# Patient Record
Sex: MALE | Race: WHITE | URBAN - METROPOLITAN AREA
[De-identification: names, ages, dates, MRNs, and addresses within clinical notes are randomized per-mention and may not be internally consistent; named-entity substitution may affect disease eponyms.]

---

## 2017-04-20 ENCOUNTER — HOSPITAL ENCOUNTER (OUTPATIENT)
Dept: SURGERY | Age: 62
Discharge: HOME | End: 2017-04-20
Attending: INTERNAL MEDICINE | Admitting: INTERNAL MEDICINE

## 2017-04-20 VITALS
OXYGEN SATURATION: 98 % | HEART RATE: 83 BPM | SYSTOLIC BLOOD PRESSURE: 111 MMHG | WEIGHT: 184 LBS | HEIGHT: 66 IN | TEMPERATURE: 97.6 F | BODY MASS INDEX: 29.57 KG/M2 | DIASTOLIC BLOOD PRESSURE: 72 MMHG | RESPIRATION RATE: 17 BRPM

## 2017-04-20 LAB
GLUCOSE BLD-MCNC: 95 MG/DL (ref 70–99)
PERFORMED ON: NORMAL

## 2017-04-20 RX ORDER — GLUCOSAMINE HCL 500 MG
TABLET ORAL
COMMUNITY

## 2017-04-20 RX ORDER — SODIUM CHLORIDE, SODIUM LACTATE, POTASSIUM CHLORIDE, CALCIUM CHLORIDE 600; 310; 30; 20 MG/100ML; MG/100ML; MG/100ML; MG/100ML
INJECTION, SOLUTION INTRAVENOUS CONTINUOUS
Status: DISCONTINUED | OUTPATIENT
Start: 2017-04-20 | End: 2017-04-21 | Stop reason: HOSPADM

## 2017-04-20 RX ORDER — EZETIMIBE 10 MG/1
10 TABLET ORAL DAILY
COMMUNITY

## 2017-04-20 RX ORDER — LYSINE HCL 500 MG
TABLET ORAL
COMMUNITY

## 2017-04-20 RX ORDER — LIDOCAINE HYDROCHLORIDE 10 MG/ML
0.1 INJECTION, SOLUTION INFILTRATION; PERINEURAL ONCE
Status: DISCONTINUED | OUTPATIENT
Start: 2017-04-20 | End: 2017-04-21 | Stop reason: HOSPADM

## 2017-04-20 RX ORDER — ASPIRIN 81 MG/1
81 TABLET, CHEWABLE ORAL DAILY
COMMUNITY

## 2017-04-20 RX ORDER — UBIDECARENONE 75 MG
50 CAPSULE ORAL DAILY
COMMUNITY

## 2017-04-20 RX ADMIN — SODIUM CHLORIDE, SODIUM LACTATE, POTASSIUM CHLORIDE, CALCIUM CHLORIDE: 600; 310; 30; 20 INJECTION, SOLUTION INTRAVENOUS at 08:58

## 2017-04-20 ASSESSMENT — PAIN - FUNCTIONAL ASSESSMENT: PAIN_FUNCTIONAL_ASSESSMENT: 0-10

## 2017-04-20 ASSESSMENT — PAIN SCALES - GENERAL: PAINLEVEL_OUTOF10: 0

## 2024-04-03 ENCOUNTER — HOSPITAL ENCOUNTER (OUTPATIENT)
Dept: CARDIAC REHAB | Age: 69
Setting detail: THERAPIES SERIES
Discharge: HOME | End: 2024-04-03
Payer: COMMERCIAL

## 2024-04-03 PROCEDURE — 93798 PHYS/QHP OP CAR RHAB W/ECG: CPT

## 2024-04-05 ENCOUNTER — HOSPITAL ENCOUNTER (OUTPATIENT)
Dept: CARDIAC REHAB | Age: 69
Setting detail: THERAPIES SERIES
Discharge: HOME | End: 2024-04-05
Payer: COMMERCIAL

## 2024-04-05 LAB
GLUCOSE BLD-MCNC: 126 MG/DL (ref 70–99)
PERFORMED ON: ABNORMAL

## 2024-04-05 PROCEDURE — 93798 PHYS/QHP OP CAR RHAB W/ECG: CPT

## 2024-04-08 ENCOUNTER — HOSPITAL ENCOUNTER (OUTPATIENT)
Dept: CARDIAC REHAB | Age: 69
Setting detail: THERAPIES SERIES
Discharge: HOME | End: 2024-04-08
Payer: MEDICARE

## 2024-04-08 PROCEDURE — 93798 PHYS/QHP OP CAR RHAB W/ECG: CPT

## 2024-04-10 ENCOUNTER — HOSPITAL ENCOUNTER (OUTPATIENT)
Dept: CARDIAC REHAB | Age: 69
Setting detail: THERAPIES SERIES
Discharge: HOME | End: 2024-04-10
Payer: MEDICARE

## 2024-04-10 PROCEDURE — 93798 PHYS/QHP OP CAR RHAB W/ECG: CPT

## 2024-04-12 ENCOUNTER — HOSPITAL ENCOUNTER (OUTPATIENT)
Dept: CARDIAC REHAB | Age: 69
Setting detail: THERAPIES SERIES
Discharge: HOME | End: 2024-04-12
Payer: MEDICARE

## 2024-04-12 PROCEDURE — 93798 PHYS/QHP OP CAR RHAB W/ECG: CPT

## 2024-04-15 ENCOUNTER — HOSPITAL ENCOUNTER (OUTPATIENT)
Dept: CARDIAC REHAB | Age: 69
Setting detail: THERAPIES SERIES
Discharge: HOME | End: 2024-04-15
Payer: MEDICARE

## 2024-04-15 LAB
GLUCOSE BLD-MCNC: 145 MG/DL (ref 70–99)
PERFORMED ON: ABNORMAL

## 2024-04-15 PROCEDURE — 93798 PHYS/QHP OP CAR RHAB W/ECG: CPT

## 2024-04-17 ENCOUNTER — HOSPITAL ENCOUNTER (OUTPATIENT)
Dept: CARDIAC REHAB | Age: 69
Setting detail: THERAPIES SERIES
Discharge: HOME | End: 2024-04-17
Payer: MEDICARE

## 2024-04-17 PROCEDURE — 93798 PHYS/QHP OP CAR RHAB W/ECG: CPT

## 2024-04-19 ENCOUNTER — HOSPITAL ENCOUNTER (OUTPATIENT)
Dept: CARDIAC REHAB | Age: 69
Setting detail: THERAPIES SERIES
Discharge: HOME | End: 2024-04-19
Payer: MEDICARE

## 2024-04-19 PROCEDURE — 93798 PHYS/QHP OP CAR RHAB W/ECG: CPT

## 2024-04-22 ENCOUNTER — HOSPITAL ENCOUNTER (OUTPATIENT)
Dept: CARDIAC REHAB | Age: 69
Setting detail: THERAPIES SERIES
Discharge: HOME | End: 2024-04-22
Payer: MEDICARE

## 2024-04-22 PROCEDURE — 93798 PHYS/QHP OP CAR RHAB W/ECG: CPT

## 2024-04-24 ENCOUNTER — HOSPITAL ENCOUNTER (OUTPATIENT)
Dept: CARDIAC REHAB | Age: 69
Setting detail: THERAPIES SERIES
Discharge: HOME | End: 2024-04-24
Payer: MEDICARE

## 2024-04-24 PROCEDURE — 93798 PHYS/QHP OP CAR RHAB W/ECG: CPT

## 2024-04-26 ENCOUNTER — HOSPITAL ENCOUNTER (OUTPATIENT)
Dept: CARDIAC REHAB | Age: 69
Setting detail: THERAPIES SERIES
Discharge: HOME | End: 2024-04-26
Payer: MEDICARE

## 2024-04-26 PROCEDURE — 93798 PHYS/QHP OP CAR RHAB W/ECG: CPT

## 2024-04-29 ENCOUNTER — HOSPITAL ENCOUNTER (OUTPATIENT)
Dept: CARDIAC REHAB | Age: 69
Setting detail: THERAPIES SERIES
Discharge: HOME | End: 2024-04-29
Payer: MEDICARE

## 2024-04-29 PROCEDURE — 93798 PHYS/QHP OP CAR RHAB W/ECG: CPT

## 2024-05-01 ENCOUNTER — HOSPITAL ENCOUNTER (OUTPATIENT)
Dept: CARDIAC REHAB | Age: 69
Setting detail: THERAPIES SERIES
Discharge: HOME | End: 2024-05-01
Payer: MEDICARE

## 2024-05-01 PROCEDURE — 93798 PHYS/QHP OP CAR RHAB W/ECG: CPT

## 2024-05-03 ENCOUNTER — HOSPITAL ENCOUNTER (OUTPATIENT)
Dept: CARDIAC REHAB | Age: 69
Setting detail: THERAPIES SERIES
Discharge: HOME | End: 2024-05-03
Payer: MEDICARE

## 2024-05-03 PROCEDURE — 93798 PHYS/QHP OP CAR RHAB W/ECG: CPT

## 2024-05-06 ENCOUNTER — HOSPITAL ENCOUNTER (OUTPATIENT)
Dept: CARDIAC REHAB | Age: 69
Setting detail: THERAPIES SERIES
Discharge: HOME | End: 2024-05-06
Payer: MEDICARE

## 2024-05-06 PROCEDURE — 93798 PHYS/QHP OP CAR RHAB W/ECG: CPT

## 2024-05-08 ENCOUNTER — HOSPITAL ENCOUNTER (OUTPATIENT)
Dept: CARDIAC REHAB | Age: 69
Setting detail: THERAPIES SERIES
Discharge: HOME | End: 2024-05-08
Payer: MEDICARE

## 2024-05-08 PROCEDURE — 93798 PHYS/QHP OP CAR RHAB W/ECG: CPT

## 2024-05-10 ENCOUNTER — APPOINTMENT (OUTPATIENT)
Dept: CARDIAC REHAB | Age: 69
End: 2024-05-10
Payer: MEDICARE

## 2024-05-12 ENCOUNTER — APPOINTMENT (OUTPATIENT)
Dept: GENERAL RADIOLOGY | Age: 69
End: 2024-05-12
Payer: MEDICARE

## 2024-05-12 ENCOUNTER — HOSPITAL ENCOUNTER (EMERGENCY)
Age: 69
Discharge: HOME | End: 2024-05-12
Payer: MEDICARE

## 2024-05-12 VITALS
TEMPERATURE: 98.5 F | SYSTOLIC BLOOD PRESSURE: 122 MMHG | HEART RATE: 65 BPM | DIASTOLIC BLOOD PRESSURE: 85 MMHG | OXYGEN SATURATION: 100 % | RESPIRATION RATE: 16 BRPM

## 2024-05-12 DIAGNOSIS — S69.92XA INJURY OF FINGER OF LEFT HAND, INITIAL ENCOUNTER: Primary | ICD-10-CM

## 2024-05-12 PROCEDURE — 99283 EMERGENCY DEPT VISIT LOW MDM: CPT

## 2024-05-12 PROCEDURE — 73130 X-RAY EXAM OF HAND: CPT

## 2024-05-12 ASSESSMENT — LIFESTYLE VARIABLES
HOW MANY STANDARD DRINKS CONTAINING ALCOHOL DO YOU HAVE ON A TYPICAL DAY: 1 OR 2
HOW OFTEN DO YOU HAVE A DRINK CONTAINING ALCOHOL: MONTHLY OR LESS

## 2024-05-13 ENCOUNTER — HOSPITAL ENCOUNTER (OUTPATIENT)
Dept: CARDIAC REHAB | Age: 69
Setting detail: THERAPIES SERIES
Discharge: HOME | End: 2024-05-13
Payer: MEDICARE

## 2024-05-13 PROCEDURE — 93798 PHYS/QHP OP CAR RHAB W/ECG: CPT

## 2024-05-14 NOTE — ED PROVIDER NOTES
finger, patient is able to flex and extend without evidence of tendon involvement.  No break in skin integrity.  Other extremities are atraumatic, no other digit trauma.    DIAGNOSTIC RESULTS   LABS:    Labs Reviewed - No data to display    When ordered only abnormal lab results are displayed. All other labs were within normal range or not returned as of this dictation.    EKG: When ordered, EKG's are interpreted by the Emergency Department Physician in the absence of a cardiologist.  Please see their note for interpretation of EKG.    RADIOLOGY:   Non-plain film images such as CT, Ultrasound and MRI are read by the radiologist. Plain radiographic images are visualized and preliminarily interpreted by the ED Provider with the below findings:        Interpretation per the Radiologist below, if available at the time of this note:    XR HAND LEFT (MIN 3 VIEWS)   Final Result   No fracture or dislocation of the left hand.           XR HAND LEFT (MIN 3 VIEWS)    Result Date: 5/12/2024  EXAMINATION: THREE XRAY VIEWS OF THE LEFT HAND 5/12/2024 12:57 pm COMPARISON: None. HISTORY: ORDERING SYSTEM PROVIDED HISTORY: Injury TECHNOLOGIST PROVIDED HISTORY: Reason for exam:->Injury Reason for Exam: middle finger smashed FINDINGS: The hand appears unremarkable. The metacarpals and the phalanges appear normal with no fractures. Joint spaces are well maintained. Soft tissues appear unremarkable.     No fracture or dislocation of the left hand.       No results found.    PROCEDURES   Unless otherwise noted below, none     Procedures    CRITICAL CARE TIME (.cctime)       PAST MEDICAL HISTORY      has a past medical history of Allergic rhinitis, Anemia, unspecified, Celiac disease, Celiac disease, Foot pain, left, NOLAN (obstructive sleep apnea), Other and unspecified hyperlipidemia, Routine general medical examination at a health care facility, and Unspecified essential hypertension.     EMERGENCY DEPARTMENT COURSE and DIFFERENTIAL

## 2024-05-15 ENCOUNTER — HOSPITAL ENCOUNTER (OUTPATIENT)
Dept: CARDIAC REHAB | Age: 69
Setting detail: THERAPIES SERIES
Discharge: HOME | End: 2024-05-15
Payer: MEDICARE

## 2024-05-15 PROCEDURE — 93798 PHYS/QHP OP CAR RHAB W/ECG: CPT

## 2024-05-17 ENCOUNTER — APPOINTMENT (OUTPATIENT)
Dept: CARDIAC REHAB | Age: 69
End: 2024-05-17
Payer: MEDICARE

## 2024-05-20 ENCOUNTER — APPOINTMENT (OUTPATIENT)
Dept: CARDIAC REHAB | Age: 69
End: 2024-05-20
Payer: MEDICARE

## 2024-05-22 ENCOUNTER — APPOINTMENT (OUTPATIENT)
Dept: CARDIAC REHAB | Age: 69
End: 2024-05-22
Payer: MEDICARE

## 2024-05-24 ENCOUNTER — APPOINTMENT (OUTPATIENT)
Dept: CARDIAC REHAB | Age: 69
End: 2024-05-24
Payer: MEDICARE

## 2024-05-29 ENCOUNTER — APPOINTMENT (OUTPATIENT)
Dept: CARDIAC REHAB | Age: 69
End: 2024-05-29
Payer: MEDICARE

## 2024-05-31 ENCOUNTER — APPOINTMENT (OUTPATIENT)
Dept: CARDIAC REHAB | Age: 69
End: 2024-05-31
Payer: MEDICARE